# Patient Record
Sex: FEMALE | Race: BLACK OR AFRICAN AMERICAN | NOT HISPANIC OR LATINO | URBAN - METROPOLITAN AREA
[De-identification: names, ages, dates, MRNs, and addresses within clinical notes are randomized per-mention and may not be internally consistent; named-entity substitution may affect disease eponyms.]

---

## 2018-12-26 ENCOUNTER — EMERGENCY (EMERGENCY)
Facility: HOSPITAL | Age: 48
LOS: 1 days | Discharge: HOME | End: 2018-12-26
Attending: EMERGENCY MEDICINE | Admitting: EMERGENCY MEDICINE
Payer: MEDICAID

## 2018-12-26 ENCOUNTER — INBOUND DOCUMENT (OUTPATIENT)
Age: 48
End: 2018-12-26

## 2018-12-26 VITALS
DIASTOLIC BLOOD PRESSURE: 85 MMHG | TEMPERATURE: 98 F | SYSTOLIC BLOOD PRESSURE: 128 MMHG | RESPIRATION RATE: 18 BRPM | OXYGEN SATURATION: 95 % | HEART RATE: 77 BPM

## 2018-12-26 VITALS
WEIGHT: 250 LBS | OXYGEN SATURATION: 99 % | RESPIRATION RATE: 20 BRPM | HEART RATE: 91 BPM | DIASTOLIC BLOOD PRESSURE: 104 MMHG | SYSTOLIC BLOOD PRESSURE: 160 MMHG | HEIGHT: 63 IN | TEMPERATURE: 100 F

## 2018-12-26 DIAGNOSIS — K92.0 HEMATEMESIS: ICD-10-CM

## 2018-12-26 DIAGNOSIS — R11.10 VOMITING, UNSPECIFIED: ICD-10-CM

## 2018-12-26 DIAGNOSIS — I10 ESSENTIAL (PRIMARY) HYPERTENSION: ICD-10-CM

## 2018-12-26 DIAGNOSIS — R06.00 DYSPNEA, UNSPECIFIED: ICD-10-CM

## 2018-12-26 DIAGNOSIS — R10.13 EPIGASTRIC PAIN: ICD-10-CM

## 2018-12-26 DIAGNOSIS — I51.7 CARDIOMEGALY: ICD-10-CM

## 2018-12-26 DIAGNOSIS — R05 COUGH: ICD-10-CM

## 2018-12-26 DIAGNOSIS — R04.2 HEMOPTYSIS: ICD-10-CM

## 2018-12-26 LAB
ALBUMIN SERPL ELPH-MCNC: 4.5 G/DL — SIGNIFICANT CHANGE UP (ref 3.3–5)
ALP SERPL-CCNC: 55 U/L — SIGNIFICANT CHANGE UP (ref 40–120)
ALT FLD-CCNC: 12 U/L — SIGNIFICANT CHANGE UP (ref 10–45)
ANION GAP SERPL CALC-SCNC: 12 MMOL/L — SIGNIFICANT CHANGE UP (ref 5–17)
APPEARANCE UR: CLEAR — SIGNIFICANT CHANGE UP
AST SERPL-CCNC: 15 U/L — SIGNIFICANT CHANGE UP (ref 10–40)
BILIRUB SERPL-MCNC: 0.4 MG/DL — SIGNIFICANT CHANGE UP (ref 0.2–1.2)
BILIRUB UR-MCNC: NEGATIVE — SIGNIFICANT CHANGE UP
BUN SERPL-MCNC: 13 MG/DL — SIGNIFICANT CHANGE UP (ref 7–23)
CALCIUM SERPL-MCNC: 9.4 MG/DL — SIGNIFICANT CHANGE UP (ref 8.4–10.5)
CHLORIDE SERPL-SCNC: 100 MMOL/L — SIGNIFICANT CHANGE UP (ref 96–108)
CO2 SERPL-SCNC: 28 MMOL/L — SIGNIFICANT CHANGE UP (ref 22–31)
COLOR SPEC: YELLOW — SIGNIFICANT CHANGE UP
CREAT SERPL-MCNC: 0.98 MG/DL — SIGNIFICANT CHANGE UP (ref 0.5–1.3)
DIFF PNL FLD: NEGATIVE — SIGNIFICANT CHANGE UP
GLUCOSE SERPL-MCNC: 95 MG/DL — SIGNIFICANT CHANGE UP (ref 70–99)
GLUCOSE UR QL: NEGATIVE — SIGNIFICANT CHANGE UP
HCT VFR BLD CALC: 39.8 % — SIGNIFICANT CHANGE UP (ref 34.5–45)
HGB BLD-MCNC: 12.8 G/DL — SIGNIFICANT CHANGE UP (ref 11.5–15.5)
KETONES UR-MCNC: NEGATIVE — SIGNIFICANT CHANGE UP
LACTATE SERPL-SCNC: 0.9 MMOL/L — SIGNIFICANT CHANGE UP (ref 0.5–2)
LEGIONELLA AG UR QL: NEGATIVE — SIGNIFICANT CHANGE UP
LEUKOCYTE ESTERASE UR-ACNC: NEGATIVE — SIGNIFICANT CHANGE UP
LIDOCAIN IGE QN: 16 U/L — SIGNIFICANT CHANGE UP (ref 7–60)
MCHC RBC-ENTMCNC: 26.8 PG — LOW (ref 27–34)
MCHC RBC-ENTMCNC: 32.2 G/DL — SIGNIFICANT CHANGE UP (ref 32–36)
MCV RBC AUTO: 83.3 FL — SIGNIFICANT CHANGE UP (ref 80–100)
NITRITE UR-MCNC: NEGATIVE — SIGNIFICANT CHANGE UP
OB PNL STL: NEGATIVE — SIGNIFICANT CHANGE UP
PH UR: 6 — SIGNIFICANT CHANGE UP (ref 5–8)
PLATELET # BLD AUTO: 239 K/UL — SIGNIFICANT CHANGE UP (ref 150–400)
POTASSIUM SERPL-MCNC: 3.9 MMOL/L — SIGNIFICANT CHANGE UP (ref 3.5–5.3)
POTASSIUM SERPL-SCNC: 3.9 MMOL/L — SIGNIFICANT CHANGE UP (ref 3.5–5.3)
PROT SERPL-MCNC: 8.2 G/DL — SIGNIFICANT CHANGE UP (ref 6–8.3)
PROT UR-MCNC: NEGATIVE MG/DL — SIGNIFICANT CHANGE UP
RAPID RVP RESULT: SIGNIFICANT CHANGE UP
RBC # BLD: 4.78 M/UL — SIGNIFICANT CHANGE UP (ref 3.8–5.2)
RBC # FLD: 14.3 % — SIGNIFICANT CHANGE UP (ref 10.3–16.9)
SODIUM SERPL-SCNC: 140 MMOL/L — SIGNIFICANT CHANGE UP (ref 135–145)
SP GR SPEC: 1.02 — SIGNIFICANT CHANGE UP (ref 1–1.03)
TROPONIN T SERPL-MCNC: <0.01 NG/ML — SIGNIFICANT CHANGE UP (ref 0–0.01)
UROBILINOGEN FLD QL: 0.2 E.U./DL — SIGNIFICANT CHANGE UP
WBC # BLD: 5.8 K/UL — SIGNIFICANT CHANGE UP (ref 3.8–10.5)
WBC # FLD AUTO: 5.8 K/UL — SIGNIFICANT CHANGE UP (ref 3.8–10.5)

## 2018-12-26 PROCEDURE — 93010 ELECTROCARDIOGRAM REPORT: CPT

## 2018-12-26 PROCEDURE — 86900 BLOOD TYPING SEROLOGIC ABO: CPT

## 2018-12-26 PROCEDURE — 36415 COLL VENOUS BLD VENIPUNCTURE: CPT

## 2018-12-26 PROCEDURE — 80053 COMPREHEN METABOLIC PANEL: CPT

## 2018-12-26 PROCEDURE — 83605 ASSAY OF LACTIC ACID: CPT

## 2018-12-26 PROCEDURE — 94640 AIRWAY INHALATION TREATMENT: CPT

## 2018-12-26 PROCEDURE — 85610 PROTHROMBIN TIME: CPT

## 2018-12-26 PROCEDURE — 84484 ASSAY OF TROPONIN QUANT: CPT

## 2018-12-26 PROCEDURE — 74177 CT ABD & PELVIS W/CONTRAST: CPT | Mod: 26

## 2018-12-26 PROCEDURE — 71045 X-RAY EXAM CHEST 1 VIEW: CPT | Mod: 26

## 2018-12-26 PROCEDURE — 85379 FIBRIN DEGRADATION QUANT: CPT

## 2018-12-26 PROCEDURE — 96368 THER/DIAG CONCURRENT INF: CPT

## 2018-12-26 PROCEDURE — 96375 TX/PRO/DX INJ NEW DRUG ADDON: CPT | Mod: XU

## 2018-12-26 PROCEDURE — 82550 ASSAY OF CK (CPK): CPT

## 2018-12-26 PROCEDURE — 86901 BLOOD TYPING SEROLOGIC RH(D): CPT

## 2018-12-26 PROCEDURE — 99284 EMERGENCY DEPT VISIT MOD MDM: CPT | Mod: 25

## 2018-12-26 PROCEDURE — 96365 THER/PROPH/DIAG IV INF INIT: CPT | Mod: XU

## 2018-12-26 PROCEDURE — 81003 URINALYSIS AUTO W/O SCOPE: CPT

## 2018-12-26 PROCEDURE — 85027 COMPLETE CBC AUTOMATED: CPT

## 2018-12-26 PROCEDURE — 99285 EMERGENCY DEPT VISIT HI MDM: CPT | Mod: 25

## 2018-12-26 PROCEDURE — 87449 NOS EACH ORGANISM AG IA: CPT

## 2018-12-26 PROCEDURE — 82553 CREATINE MB FRACTION: CPT

## 2018-12-26 PROCEDURE — 83690 ASSAY OF LIPASE: CPT

## 2018-12-26 PROCEDURE — 71275 CT ANGIOGRAPHY CHEST: CPT

## 2018-12-26 PROCEDURE — 71275 CT ANGIOGRAPHY CHEST: CPT | Mod: 26

## 2018-12-26 PROCEDURE — 86850 RBC ANTIBODY SCREEN: CPT

## 2018-12-26 PROCEDURE — 85730 THROMBOPLASTIN TIME PARTIAL: CPT

## 2018-12-26 PROCEDURE — 93005 ELECTROCARDIOGRAM TRACING: CPT

## 2018-12-26 PROCEDURE — 87581 M.PNEUMON DNA AMP PROBE: CPT

## 2018-12-26 PROCEDURE — 87633 RESP VIRUS 12-25 TARGETS: CPT

## 2018-12-26 PROCEDURE — 74177 CT ABD & PELVIS W/CONTRAST: CPT

## 2018-12-26 PROCEDURE — 71045 X-RAY EXAM CHEST 1 VIEW: CPT

## 2018-12-26 PROCEDURE — 87798 DETECT AGENT NOS DNA AMP: CPT

## 2018-12-26 PROCEDURE — 87486 CHLMYD PNEUM DNA AMP PROBE: CPT

## 2018-12-26 PROCEDURE — 87040 BLOOD CULTURE FOR BACTERIA: CPT

## 2018-12-26 RX ORDER — CEFTRIAXONE 500 MG/1
1 INJECTION, POWDER, FOR SOLUTION INTRAMUSCULAR; INTRAVENOUS ONCE
Qty: 0 | Refills: 0 | Status: COMPLETED | OUTPATIENT
Start: 2018-12-26 | End: 2018-12-26

## 2018-12-26 RX ORDER — FAMOTIDINE 10 MG/ML
20 INJECTION INTRAVENOUS ONCE
Qty: 0 | Refills: 0 | Status: COMPLETED | OUTPATIENT
Start: 2018-12-26 | End: 2018-12-26

## 2018-12-26 RX ORDER — CLARITHROMYCIN 500 MG
1 TABLET ORAL
Qty: 1 | Refills: 0 | OUTPATIENT
Start: 2018-12-26 | End: 2018-12-29

## 2018-12-26 RX ORDER — SODIUM CHLORIDE 9 MG/ML
1000 INJECTION INTRAMUSCULAR; INTRAVENOUS; SUBCUTANEOUS
Qty: 0 | Refills: 0 | Status: DISCONTINUED | OUTPATIENT
Start: 2018-12-26 | End: 2018-12-30

## 2018-12-26 RX ORDER — IPRATROPIUM/ALBUTEROL SULFATE 18-103MCG
3 AEROSOL WITH ADAPTER (GRAM) INHALATION ONCE
Qty: 0 | Refills: 0 | Status: COMPLETED | OUTPATIENT
Start: 2018-12-26 | End: 2018-12-26

## 2018-12-26 RX ORDER — ONDANSETRON 8 MG/1
4 TABLET, FILM COATED ORAL ONCE
Qty: 0 | Refills: 0 | Status: COMPLETED | OUTPATIENT
Start: 2018-12-26 | End: 2018-12-26

## 2018-12-26 RX ORDER — SODIUM CHLORIDE 9 MG/ML
1000 INJECTION INTRAMUSCULAR; INTRAVENOUS; SUBCUTANEOUS ONCE
Qty: 0 | Refills: 0 | Status: COMPLETED | OUTPATIENT
Start: 2018-12-26 | End: 2018-12-26

## 2018-12-26 RX ORDER — AZITHROMYCIN 500 MG/1
500 TABLET, FILM COATED ORAL ONCE
Qty: 0 | Refills: 0 | Status: COMPLETED | OUTPATIENT
Start: 2018-12-26 | End: 2018-12-26

## 2018-12-26 RX ORDER — ONDANSETRON 8 MG/1
1 TABLET, FILM COATED ORAL
Qty: 1 | Refills: 0 | OUTPATIENT
Start: 2018-12-26 | End: 2018-12-28

## 2018-12-26 RX ADMIN — AZITHROMYCIN 500 MILLIGRAM(S): 500 TABLET, FILM COATED ORAL at 14:33

## 2018-12-26 RX ADMIN — SODIUM CHLORIDE 1000 MILLILITER(S): 9 INJECTION INTRAMUSCULAR; INTRAVENOUS; SUBCUTANEOUS at 12:41

## 2018-12-26 RX ADMIN — CEFTRIAXONE 1 GRAM(S): 500 INJECTION, POWDER, FOR SOLUTION INTRAMUSCULAR; INTRAVENOUS at 13:44

## 2018-12-26 RX ADMIN — FAMOTIDINE 20 MILLIGRAM(S): 10 INJECTION INTRAVENOUS at 12:46

## 2018-12-26 RX ADMIN — ONDANSETRON 4 MILLIGRAM(S): 8 TABLET, FILM COATED ORAL at 12:46

## 2018-12-26 RX ADMIN — SODIUM CHLORIDE 1000 MILLILITER(S): 9 INJECTION INTRAMUSCULAR; INTRAVENOUS; SUBCUTANEOUS at 13:44

## 2018-12-26 RX ADMIN — SODIUM CHLORIDE 150 MILLILITER(S): 9 INJECTION INTRAMUSCULAR; INTRAVENOUS; SUBCUTANEOUS at 14:09

## 2018-12-26 RX ADMIN — AZITHROMYCIN 255 MILLIGRAM(S): 500 TABLET, FILM COATED ORAL at 13:43

## 2018-12-26 RX ADMIN — Medication 3 MILLILITER(S): at 13:43

## 2018-12-26 RX ADMIN — CEFTRIAXONE 100 GRAM(S): 500 INJECTION, POWDER, FOR SOLUTION INTRAMUSCULAR; INTRAVENOUS at 12:54

## 2018-12-26 NOTE — ED PROVIDER NOTE - CARE PROVIDERS DIRECT ADDRESSES
[email or web address]

## 2018-12-26 NOTE — ED PROVIDER NOTE - CARE PROVIDER_API CALL
Myles Metzger), Critical Care Medicine; Internal Medicine; Pulmonary Disease; Sleep Medicine  [address] [id number]  Phone: [phone]  Fax: [phone]    Ranjith Navarro), Internal Medicine  [address] [id number]  Phone: [phone]  Fax: [phone]

## 2018-12-26 NOTE — ED ADULT NURSE NOTE - OBJECTIVE STATEMENT
pt c/o abdominal pain vomited x1 this am states color red, abdomin soft iv started labs sent md at bedside awaiting further interventions no acute distress.

## 2018-12-26 NOTE — ED ADULT TRIAGE NOTE - CHIEF COMPLAINT QUOTE
Patient BIBA c/o vomiting with blood , abdominal pain  and cough started this morning . Denies any fever nor chills . Not on thinners .

## 2018-12-26 NOTE — ED PROVIDER NOTE - MEDICAL DECISION MAKING DETAILS
49 yo F? hemoptysis  no PE  no infilt   neg RVP   ? related to URI  may dc home  cards re-eval in 1-2 days

## 2018-12-26 NOTE — ED PROVIDER NOTE - OBJECTIVE STATEMENT
47 yo F HHA  (no flu shot this year) no sig pmh  states she coughed up ? vomited < 1/2 cup blood with slimy mucous 3-4 hr ago- pos  mild upper chest and epigastric pain no flank pain - no diarrhea  no dysuria or flank tenderness   no hx of prior GI bleeds  no prior colonscopy  ? had some blood in her stool in the past few months no hx of diverticulosis or lower GI bleeds - no PUD - non smoker  no DM  ? asthma as a child - the pt she is caring for as a HHA currently does not have any fever  has chronic leg swelling no calf tenderness no hx of DVT or PE

## 2018-12-26 NOTE — PROGRESS NOTE ADULT - SUBJECTIVE AND OBJECTIVE BOX
PUD ATTENDING          PAST MEDICAL & SURGICAL HISTORY:  Hypertension      FAMILY HISTORY:      SOCIAL HISTORY:    REVIEW OF SYSTEMS:  Constitutional: () weight change, () fever,  () chills, () fatigue, () night sweats  Eyes: () discharge, () eye pain, () visual change  ENT:  () hearing difficulty, () vertigo, () sinus pain,  () throat pain, () epistaxis, () dysphagia, () hoarseness  Neck: () pain, () stiffness, () swelling  Respiratory: () cough, () wheezing, () hemoptysis      Cardiovascular: () chest pain, ()palpitations, () dizziness   Gastrointestinal: () abdominal pain, () nausea, () vomiting, () hematemesis, () diarrhea,  () constipation, () melena  Genitourinary:  () dysuria, () frequency, () hematuria, () incontinence      Neurologic: () headache, () memory loss, () loss of strength, () numbness, () tremor     Skin: () itching, () burning, () rash, () lesions   Lymphatic: () enlarged lymph nodes  Endocrine: () hair loss, () temperature intolerance         Musculoskeletal: () back pain, () joint pain,  () extremity pain  Psychiatric: () visual change, () auditory change, () depression, () anxiety, () suicidal  Sleep: () disorder, () insomnia, () sleep deprivation  Heme/Lymph: () easy bruising, () bleeding gums            Allergy and Immunologic: () hives, () eczema    Vital Signs Last 24 Hrs  T(C): 36.7 (26 Dec 2018 14:29), Max: 37.7 (26 Dec 2018 11:37)  T(F): 98.1 (26 Dec 2018 14:29), Max: 99.9 (26 Dec 2018 11:37)  HR: 72 (26 Dec 2018 14:29) (72 - 91)  BP: 143/77 (26 Dec 2018 14:29) (126/85 - 160/104)  BP(mean): --  RR: 18 (26 Dec 2018 14:29) (18 - 20)  SpO2: 97% (26 Dec 2018 14:29) (97% - 99%)            I&O's Detail      PHYSICAL EXAM:    Well nourished, well developed, comfortable, - acute distress; vital signs are monitored continuously  Eyes: PERRLA, EOMI, -conjunctivitis, -scleritis   Head: no focal deficit, normocephalic,  no trauma  ENMT: moist tongue, no thrush, -nasal discharge, -hoarseness, normal hearing, -cough, -hemoptysis, trachea midline  Neck: supple, - lymphadenopathy,  -masses, -JVD  Respiratory: bilateral diminished breath sounds, -wheezing, -rhonchi, -rales, -crackles  Chest: -accessory muscle use, -paradoxical breathing  Cardiovascular: regular rate and sinus rhythm, S1 S2 normal, -S3, -S4, -murmur, -gallop, -rub  Gastrointestinal: soft, nontender, nondistended, normal bowel sounds, no hepatosplenomegaly  Genitourinary: -flank pain, -dysuria  Extremities: -clubbing, -cyanosis, -edema    Vascular: peripheral pulses palpable 2+ bilaterally  Neurological: alert, oriented x 3, no focal deficit, -tremor   Skin: warm, dry, -erythema, iv sites intact  Lymph nodes; no cervical, supraclavicular or axillary adenopathy  Psychiatric: cooperative, appropriate mood      MEDICATIONS  (STANDING):  sodium chloride 0.9%. 1000 milliLiter(s) (150 mL/Hr) IV Continuous <Continuous>    MEDICATIONS  (PRN):      Allergies    No Known Allergies    Intolerances        LABS:                        12.8   5.8   )-----------( 239      ( 26 Dec 2018 12:10 )             39.8         140  |  100  |  13  ----------------------------<  95  3.9   |  28  |  0.98    Ca    9.4      26 Dec 2018 12:10    TPro  8.2  /  Alb  4.5  /  TBili  0.4  /  DBili  x   /  AST  15  /  ALT  12  /  AlkPhos  55      PT/INR - ( 26 Dec 2018 12:14 )   PT: 11.6 sec;   INR: 1.03          PTT - ( 26 Dec 2018 12:14 )  PTT:33.1 sec  Urinalysis Basic - ( 26 Dec 2018 12:10 )    Color: Yellow / Appearance: Clear / S.020 / pH: x  Gluc: x / Ketone: NEGATIVE  / Bili: Negative / Urobili: 0.2 E.U./dL   Blood: x / Protein: NEGATIVE mg/dL / Nitrite: NEGATIVE   Leuk Esterase: NEGATIVE / RBC: x / WBC x   Sq Epi: x / Non Sq Epi: x / Bacteria: x      +DVT prophylaxis  +Sleep  +Nutrition goals  -Pain  -Decubital ulcer  +GI prophylaxis (PPV, coagulopathy, Hx)  +Aspiration prophylaxis (45 degrees)    Ventilator synchronized  Tracheal cuff pressure    +Sedation/analgesia stopped once  +ID (phos, CH, mupi, SB)  -Delirium  +Cardiac Beta/ACEI-ARB/ASA/statin  +Prevention  +Education  +Medication reviewed (drug-drug interactions, PDA)  Medical devices    Discussed with ICU, PGY, CCRN, family    RADIOLOGY & ADDITIONAL STUDIES: PUD ATTENDING    Being asked to evaluate.    47 yo old HHA cough/vomited 1/2 cup of blood in the morning.  She also states that she develops dyspnea at the [facility] stop, while climbing chairs.  CXR with RLL infiltrate  D-dimer elevated.  CTPA requested    PAST MEDICAL & SURGICAL HISTORY:  Hypertension    FAMILY HISTORY:    SOCIAL HISTORY:    REVIEW OF SYSTEMS:  Constitutional: () weight change, () fever,  () chills, () fatigue, () night sweats  Eyes: () discharge, () eye pain, () visual change  ENT:  () hearing difficulty, () vertigo, () sinus pain,  () throat pain, () epistaxis, () dysphagia, () hoarseness  Neck: () pain, () stiffness, () swelling  Respiratory: () cough, () wheezing, () hemoptysis      Cardiovascular: (-) chest pain, ()palpitations, () dizziness   Gastrointestinal: (-) abdominal pain, () nausea, () vomiting, () hematemesis, () diarrhea,  () constipation, () melena  Genitourinary:  () dysuria, () frequency, () hematuria, () incontinence      Neurologic: () headache, () memory loss, () loss of strength, () numbness, () tremor     Skin: () itching, () burning, () rash, () lesions   Lymphatic: () enlarged lymph nodes  Endocrine: () hair loss, () temperature intolerance         Musculoskeletal: () back pain, () joint pain,  () extremity pain  Psychiatric: () visual change, () auditory change, () depression, () anxiety, () suicidal  Sleep: () disorder, () insomnia, () sleep deprivation  Heme/Lymph: () easy bruising, () bleeding gums            Allergy and Immunologic: () hives, () eczema    Vital Signs Last 24 Hrs  T(C): 36.7 (26 Dec 2018 14:29), Max: 37.7 (26 Dec 2018 11:37)  T(F): 98.1 (26 Dec 2018 14:29), Max: 99.9 (26 Dec 2018 11:37)  HR: 72 (26 Dec 2018 14:29) (72 - 91)  BP: 143/77 (26 Dec 2018 14:29) (126/85 - 160/104)  BP(mean): --  RR: 18 (26 Dec 2018 14:29) (18 - 20)  SpO2: 97% (26 Dec 2018 14:29) (97% - 99%)    I&O's Detail    PHYSICAL EXAM:    Well nourished, well developed, comfortable, - acute distress; vital signs are monitored continuously  Eyes: PERRLA, EOMI, -conjunctivitis, -scleritis   Head: no focal deficit, normocephalic,  no trauma  ENMT: moist tongue, no thrush, -nasal discharge, -hoarseness, normal hearing, -cough, -hemoptysis, trachea midline  Neck: supple, - lymphadenopathy,  -masses, -JVD  Respiratory: bilateral diminished breath sounds, -wheezing, -rhonchi, -rales, -crackles  Chest: -accessory muscle use, -paradoxical breathing  Cardiovascular: regular rate and sinus rhythm, S1 S2 normal, -S3, -S4, -murmur, -gallop, -rub  Gastrointestinal: soft, nontender, nondistended, normal bowel sounds, no hepatosplenomegaly  Genitourinary: -flank pain, -dysuria  Extremities: -clubbing, -cyanosis, -edema    Vascular: peripheral pulses palpable 2+ bilaterally  Neurological: alert, oriented x 3, no focal deficit, -tremor   Skin: warm, dry, -erythema, iv sites intact  Lymph nodes; no cervical, supraclavicular or axillary adenopathy  Psychiatric: cooperative, appropriate mood      MEDICATIONS  (STANDING):  sodium chloride 0.9%. 1000 milliLiter(s) (150 mL/Hr) IV Continuous <Continuous>    MEDICATIONS  (PRN):      Allergies    No Known Allergies    Intolerances        LABS:                        12.8   5.8   )-----------( 239      ( 26 Dec 2018 12:10 )             39.8         140  |  100  |  13  ----------------------------<  95  3.9   |  28  |  0.98    Ca    9.4      26 Dec 2018 12:10    TPro  8.2  /  Alb  4.5  /  TBili  0.4  /  DBili  x   /  AST  15  /  ALT  12  /  AlkPhos  55      PT/INR - ( 26 Dec 2018 12:14 )   PT: 11.6 sec;   INR: 1.03          PTT - ( 26 Dec 2018 12:14 )  PTT:33.1 sec  Urinalysis Basic - ( 26 Dec 2018 12:10 )    Color: Yellow / Appearance: Clear / S.020 / pH: x  Gluc: x / Ketone: NEGATIVE  / Bili: Negative / Urobili: 0.2 E.U./dL   Blood: x / Protein: NEGATIVE mg/dL / Nitrite: NEGATIVE   Leuk Esterase: NEGATIVE / RBC: x / WBC x   Sq Epi: x / Non Sq Epi: x / Bacteria: x      +DVT prophylaxis  +Sleep  +Nutrition goals  -Pain  -Decubital ulcer  +GI prophylaxis (PPV, coagulopathy, Hx)  +Aspiration prophylaxis (45 degrees)    Ventilator synchronized  Tracheal cuff pressure    +Sedation/analgesia stopped once  +ID (phos, CH, mupi, SB)  -Delirium  +Cardiac Beta/ACEI-ARB/ASA/statin  +Prevention  +Education  +Medication reviewed (drug-drug interactions, PDA)  Medical devices    Discussed with ER MD, trusted radiologist    RADIOLOGY & ADDITIONAL STUDIES:    CXR: RLL infiltrate    Reviewed at length, no lung changes especially RLL, no evidence of PE; cardiomegaly      EXAM:  CT ANGIO CHEST PE PROTOCOL IC                          PROCEDURE DATE:  2018          INTERPRETATION:  CTA (CT angiography) of the CHEST     INDICATION: Hemoptysis. Assess for pulmonary embolism.    TECHNIQUE: CT angiography of the chest was performed during intravenous   bolus injection of 93 cc of Optiray 350 (7 cc discarded).    Post-processing including the production of axial, coronal and sagittal   multiplanar reformatted images and axial and coronal maximum intensity   projections (MIPs) was performed.    PRIOR STUDY: None.    FINDINGS:     There is suboptimal opacification of the pulmonary arterial vasculature   which limits assessment of peripheral branches. Within limitation, no   pulmonary embolism is seen. There is no airspace consolidation. There is   no pleural effusion. Central airways are patent.    The thoracic aorta is normal in appearance. The heart is enlarged. No   pericardial effusion is seen.     No mediastinal, hilar or axillary lymphadenopathy is seen. There is no   mediastinal mass    Evaluation of the osseous structures demonstrates mild degenerative   changes of the spine.      IMPRESSION:   1.  Suboptimal exam. No central pulmonary embolism.   2.  Cardiomegaly.      EXAM:  CT ABDOMEN AND PELVIS IC                          PROCEDURE DATE:  2018          INTERPRETATION:  CT of the ABDOMEN and PELVIS with intravenous contrast   dated 2018 1:45 PM    INDICATION: Hemoptysis versus hematemesis.    TECHNIQUE: CT of the abdomen and pelvis was performed using 90 cc of   intravenous Optiray 350 (10 cc discarded). Axial, sagittal and coronal   images were produced and reviewed.    COMPARISON: None.    The study is interpreted with a simultaneously performed CTA of the   chest, dictated as a separate report.    FINDINGS:   Findings above the diaphragm are be fully detailed on the simultaneously   performed CTA chest exam, which is dictated as a separate report.    There are no focal hepatic lesions. The hepatic and portal veins are   patent. No radiopaque stones are seen within the gallbladder. The   pancreas is normal in appearance. No splenic abnormalities are seen.   There is mild nodular thickening of left adrenal gland. The kidneys are   unremarkable. There is no hydronephrosis or perinephric stranding.    No abdominal aortic aneurysm is seen. No abdominal or pelvic   lymphadenopathy is seen.    Evaluation of the bowel is somewhat limited without use of enteric   contrast. There is no evidence of obstruction. There are scattered   colonic diverticula without evidence of diverticulitis. No areas of bowel   thickening are identified. There is a large broad-based predominantly   fat-containing umbilical hernia, also containing the anti-mesentery   portion of a very short segment of small bowel without evidence of   strangulation or obstruction.    Images of the pelvis demonstrate the uterus and adnexae to be normal in   appearance. The urinary bladder is unremarkable. There is trace amount of   free pelvic fluid, which is likely physiologic.    Evaluation of the osseous structures demonstrates mild degenerative   changes of the spine. Mild asymmetric arthrosis of the inferior right SI   joint.      IMPRESSION:  No acute intra-abdominal or pelvic pathology.      Ch type umbilical hernia, as described.

## 2018-12-26 NOTE — ED PROVIDER NOTE - PROGRESS NOTE DETAILS
no obv infilt /no PE- no influenza=  Dima abd hernia noted=  trop neg  x 2 - may dc  pt stable feeling fine no hemoptysis or

## 2018-12-31 LAB
CULTURE RESULTS: SIGNIFICANT CHANGE UP
CULTURE RESULTS: SIGNIFICANT CHANGE UP
SPECIMEN SOURCE: SIGNIFICANT CHANGE UP
SPECIMEN SOURCE: SIGNIFICANT CHANGE UP

## 2019-04-04 PROBLEM — Z00.00 ENCOUNTER FOR PREVENTIVE HEALTH EXAMINATION: Status: ACTIVE | Noted: 2019-04-04

## 2021-08-17 NOTE — ED PROVIDER NOTE - CONDUCTED A DETAILED DISCUSSION WITH PATIENT AND/OR GUARDIAN REGARDING, MDM
Patient with hx of ulcerative colitis.  Last colonoscopy 02/27/17: ...The biopsy that was taken in the rectum was analyzed by pathology and indicates proctitis (inflammation of the rectum).    Upcoming surgery 08/24/21 Hysterectomy w/Dr Ibrahim.    Patient states that for her upcoming surgery with OB GYN, provider was concerned about her health status regarding the use of mercaptopurine 50 mg daily as prescribed by Dr Obrien. Patient explained to Dr Ibrahim's staff  that she was healthy and had not had a UC flare up in years. Patient is currently taking mesalamine  800 mg TID as well. Still, patient is concerned that her hysterectomy could be postponed because of the use of mercaptopurine and she would not like to delay surgery.  Patient would appreciate if Dr Obrien communicates with Dr Ibrahim's office regarding her GI medication status and health. Patient states she feels very healthy regarding her bowels and reiterates that has had no flare ups in years and has taken mercaptopurine for 20 years now without any problems.  Patient is requesting a statement with her current GI condition to be sent to Dr Ibrahim's office or direct communication between providers to clarify.    Patient is aware of the need to update colonoscopy by the end of this year.    Routed to Dr Obrien's office to advise.       need to admit/lab results